# Patient Record
(demographics unavailable — no encounter records)

---

## 2024-10-14 NOTE — PHYSICAL EXAM
[None] : none [Rapid] : rapid [Obsessions] : obsessions [Anxious] : anxious [Normal] : alert, oriented to person, place, and time [Fair] : fair [FreeTextEntry6] : interrupts writer while speaking. better [FreeTextEntry7] : better [FreeTextEntry8] : "I feel anxious." better [FreeTextEntry9] : better

## 2024-10-14 NOTE — HISTORY OF PRESENT ILLNESS
[FreeTextEntry1] : The following service was provided using telehealth between writer/provider and patient. The patient was at home. The writer was at clinic. Patient was alone and consented to telehealth format. All treatment plans through and including today's date were reviewed with the patient.  Patient is here for 1 month follow-up visit via telehealth.  Last month Wellbutrin 75 mg was increased to  mg and Zoloft 50 mg was restarted. States he has no car, so he was not able to pick it up from the pharmacy. States he graduated from [employer]. Has a Bachelors in Science. Going for master's but prior to that they are requering him to take Preparatory courses. Left job in Aug that he was working for in the summer. Looking for PT job in a few weeks.   Mood anxious. Keeps ruminating on "I could have done things different when i was in college like decrease worry or dwell about things that do not matter.  Sleeping:  tired too often, +6 hours of sleep. Poor sleep hygiene due to no structure.     Appetite is increased.  Energy is decreased Concentration and motivation are both decreased. Denies any AVH, SI or HI.  Caffeine: Drinks more coffee and more energy drinks.  [Verbal consent obtained from patient] : the patient, [unfilled]

## 2024-10-14 NOTE — DISCUSSION/SUMMARY
[FreeTextEntry1] : Assessment: Patient is a 23 yo male with h/o depression and anxiety seen today for medication management. Patient is compliant with his medications, tolerating it well without any side effects. I-STOP was checked without any problems.  PLAN: Re-start Zoloft 25 mg 50 mg PO QD for depression and anxiety D/C Prozac 20 mg PO QD for OCD, anxiety Increase Wellbutrin 75  mg PO QAM for depression, low motivation, energy, concentration and decrease SSRI induced sexual dysfunction. - Discussed risks and benefits of medications including side effects of GI and sexual with SSRI. Alternative strategies including no intervention discussed with patient. Patient consents to current medications as prescribed. - Discussed with patient regarding importance of abstinence and sobriety from alcohol and drugs. Educated about relationship between worsening mood/anxiety symptoms and drug use and improvement of symptoms with abstinence.  - Discussed about unpredictable effects including cardiorespiratory collapse from the combination of illicit drugs and prescribed medications. Patient verbalized understanding. - Patient understands to contact clinic prn with concerns and agrees to call 911 or go to nearest ER if symptoms worsen. - Next appointment made in 1 month. Patient left the office without any distress.

## 2025-01-17 NOTE — ASSESSMENT
[FreeTextEntry1] : This is a 24-year-old male whose history has been reviewed above  He did follow-up finally for his repeat calcium.  He is now medicated and much more appropriate he seems upbeat he is planning ahead and satisfied with his accomplishments.  He is on an SSRI we did obtain a serum sodium  In terms of his hypercalcemia he has not changed his diet but states that he is not taking vitamin D or calcium we did do a thorough evaluation since it may be difficult to get him back a parathyroid related peptide was obtained as well as vitamin D and vitamin D3 and ACE level and a vitamin A level were obtained  In addition his scar is visible but does not seem to be a keloid however he is a bit focused on it and I did refer him to plastic surgery

## 2025-01-17 NOTE — PHYSICAL EXAM
[Normal] : no respiratory distress, lungs were clear to auscultation bilaterally and no accessory muscle use [No Focal Deficits] : no focal deficits [de-identified] : Scar right arm [de-identified] : Improved

## 2025-01-17 NOTE — HISTORY OF PRESENT ILLNESS
[FreeTextEntry8] : This is a 24-year-old male who comes in for follow-up for his anxiety disorder but more significantly for hypercalcemia.  He presents after multiple calls and canceled appointments.  In addition I did speak to his therapist about his activities and metabolic abnormalities  Patient returns on medication he is much more upbeat and less anxious.  He is making plans for school.  However he states that he did not cut down on his dairy products but is not taking any supplements or vitamin D

## 2025-04-25 NOTE — HISTORY OF PRESENT ILLNESS
[FreeTextEntry1] : Patient is here in the office for face to face interview with writer for follow-up visit.  Writer last saw patient in Oct 2024. At that time Zoloft 50 mg was started and Wellbutrin was increased from 75 to  mg. States he is still on Zoloft 50 and was on Wellbutrin 75 and not  mg. States he has less intrusive thoughts and he feel overall better.   Mood: better mood. More uplifting. People notice when he is not taking the medications.  Sleeping: missed classes a lot. 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  Appetite is increased as the job is sedentary.  Energy: decreased Concentration and motivation are both decreased. Denies any AVH, SI or HI.  Caffeine: Drinks more coffee and more energy drinks.

## 2025-06-20 NOTE — DISCUSSION/SUMMARY
[FreeTextEntry1] : Assessment: Patient is a 23 yo male with h/o depression and anxiety seen today for medication management. Patient is compliant with his medications, tolerating it well without any side effects. I-STOP was checked without any problems.  PLAN: Increase Zoloft 50 to 75 mg PO QD for depression and anxiety Change formulation of Wellbutrin form 75 mg x 2 to  mg PO QAM for depression, low motivation, energy, concentration and decrease SSRI induced sexual dysfunction. - Discussed risks and benefits of medications including side effects of GI and sexual with SSRI. Alternative strategies including no intervention discussed with patient. Patient consents to current medications as prescribed. - Discussed with patient regarding importance of abstinence and sobriety from alcohol and drugs. Educated about relationship between worsening mood/anxiety symptoms and drug use and improvement of symptoms with abstinence.  - Discussed about unpredictable effects including cardiorespiratory collapse from the combination of illicit drugs and prescribed medications. Patient verbalized understanding. - Patient understands to contact clinic prn with concerns and agrees to call 911 or go to nearest ER if symptoms worsen. - Next appointment made in 2 months. Patient left the office without any distress.

## 2025-06-20 NOTE — HISTORY OF PRESENT ILLNESS
[FreeTextEntry1] : Patient is here in the office for face to face interview with writer for 2 month follow-up visit.  Patient came in at 10:53 for a 10:30 am appt. States "The Ly[employer] dropped me off at a wrong locaiton." Does not drive.   At that time Wellbutrin 75 was increased to  mg.  States he didn't feel a lot of energy from the increase. Patient states he just came back home form work. Works as a security person. Feels liek he is all over the place. When patient si speaking he torres snot finish the senstence and stops half way and says "I dont't know." Not confident with self.   Mood: depression. Less energy, and motivation. +Anxiety. 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 Writer was asking if he will be able to handle that. "I think i can do it."  Sleeping: missed classes a lot. Has a problem in getting up in tiime. Time management is a problem. Thursday he is working from 12am-8am as a .  Appetite is increased as the job is sedentary.  Energy: decreased Concentration and motivation are both decreased. Denies any AVH, SI or HI.  Caffeine: Drinks more coffee and more energy drinks.